# Patient Record
Sex: FEMALE | Race: WHITE | NOT HISPANIC OR LATINO
[De-identification: names, ages, dates, MRNs, and addresses within clinical notes are randomized per-mention and may not be internally consistent; named-entity substitution may affect disease eponyms.]

---

## 2017-01-18 ENCOUNTER — LABORATORY RESULT (OUTPATIENT)
Age: 27
End: 2017-01-18

## 2017-01-18 ENCOUNTER — APPOINTMENT (OUTPATIENT)
Dept: GASTROENTEROLOGY | Facility: CLINIC | Age: 27
End: 2017-01-18

## 2017-01-18 VITALS
RESPIRATION RATE: 16 BRPM | DIASTOLIC BLOOD PRESSURE: 65 MMHG | TEMPERATURE: 97.9 F | OXYGEN SATURATION: 99 % | HEART RATE: 70 BPM | HEIGHT: 66 IN | WEIGHT: 133 LBS | BODY MASS INDEX: 21.38 KG/M2 | SYSTOLIC BLOOD PRESSURE: 105 MMHG

## 2017-01-18 DIAGNOSIS — Z78.9 OTHER SPECIFIED HEALTH STATUS: ICD-10-CM

## 2017-01-18 NOTE — PHYSICAL EXAM
[General Appearance - Alert] : alert [General Appearance - In No Acute Distress] : in no acute distress [Sclera] : the sclera and conjunctiva were normal [Hearing Threshold Finger Rub Not [place]] : hearing was normal [Neck Appearance] : the appearance of the neck was normal [Heart Sounds] : normal S1 and S2 [Bowel Sounds] : normal bowel sounds [Abdomen Soft] : soft [Abdomen Mass (___ Cm)] : no abdominal mass palpated [No CVA Tenderness] : no ~M costovertebral angle tenderness [Abnormal Walk] : normal gait [] : no rash [No Focal Deficits] : no focal deficits [Oriented To Time, Place, And Person] : oriented to person, place, and time [Cervical Lymph Nodes Enlarged Posterior Bilaterally] : posterior cervical [Cervical Lymph Nodes Enlarged Anterior Bilaterally] : anterior cervical [FreeTextEntry1] : mild discomfort with palpation

## 2017-01-18 NOTE — CONSULT LETTER
[Dear  ___] : Dear  [unfilled], [Consult Letter:] : I had the pleasure of evaluating your patient, [unfilled]. [Please see my note below.] : Please see my note below. [Consult Closing:] : Thank you very much for allowing me to participate in the care of this patient.  If you have any questions, please do not hesitate to contact me. [Sincerely,] : Sincerely, [Cali Benavidez MD] : Cali Benavidez MD [Director] : Director [Inflammatory Bowel Disease] : Inflammatory Bowel Disease

## 2017-01-18 NOTE — HISTORY OF PRESENT ILLNESS
[de-identified] : 26 year old female with PMH of anxiety, SVT s/p failed ablation with subsequent Mobitz heart block presents for constipation of 3+ years. patient previously seen by Dr. dennis for symptoms of bloating constipation and abdominal discomfort. she notes discomfort in the RUQ and LLQ that sometimes is relieved by having a bowel movement. She has on average 1-2 per week only with the help of senna. Previously tried Linzess but she thinks it lost effect in 2 months. patient also has had two failed attempts at c scope. the first due to anesthesia from cardiac issues, and second due to panic attack from self diagnosed PTSD due to failed ablation. no FH of IBD and notes having eczema as a child and canker sores. + CRC history in grandfather - maternal in his later 70s. No blood in stool ever. \par \par Did have a flex sig which found proctitis per note from Dr. Dennis office.

## 2017-01-18 NOTE — ASSESSMENT
[FreeTextEntry1] : 26 year old female with SVT and Mobitz II ("eventually i'll need a pacemaker") here for evaluation of consipation with ? of IBD vs IBS-C. Never has managed a full colonoscopy. No relief from Miralax (however only 3 days of use) and dependent on Senna with diminishing returns on usage. \par \par plan\par 1. Obtain baseline labs with [facility]\par 2. Check fecal chiki given ? of proctitis on last flex sig in [place]. \par 3. Request records of flex sig and path\par 4. schedule for c scope after cardiac clearance. patient may need day to meet with Anesthesia as has panic anxiety over procedures\par 5. Amitiza samples given to patient \par 6. AXR to evaluate stool burden; samples of amitiza provided. \par \par At f/u can determine value of colonoscopy vs. motility referral.

## 2017-01-19 LAB
ALBUMIN SERPL ELPH-MCNC: 4.8 G/DL
ALP BLD-CCNC: 41 U/L
ALT SERPL-CCNC: 17 U/L
ANION GAP SERPL CALC-SCNC: 15 MMOL/L
AST SERPL-CCNC: 24 U/L
BASOPHILS # BLD AUTO: 0.07 K/UL
BASOPHILS NFR BLD AUTO: 1.1 %
BILIRUB SERPL-MCNC: 0.6 MG/DL
BUN SERPL-MCNC: 6 MG/DL
CALCIUM SERPL-MCNC: 10.3 MG/DL
CHLORIDE SERPL-SCNC: 103 MMOL/L
CO2 SERPL-SCNC: 24 MMOL/L
CREAT SERPL-MCNC: 0.86 MG/DL
CRP SERPL-MCNC: <0.2 MG/DL
EOSINOPHIL # BLD AUTO: 0.18 K/UL
EOSINOPHIL NFR BLD AUTO: 2.8 %
GLUCOSE SERPL-MCNC: 92 MG/DL
HCT VFR BLD CALC: 39.6 %
HGB BLD-MCNC: 12.7 G/DL
IMM GRANULOCYTES NFR BLD AUTO: 0.2 %
LYMPHOCYTES # BLD AUTO: 1.68 K/UL
LYMPHOCYTES NFR BLD AUTO: 26.6 %
MAN DIFF?: NORMAL
MCHC RBC-ENTMCNC: 30 PG
MCHC RBC-ENTMCNC: 32.1 GM/DL
MCV RBC AUTO: 93.4 FL
MONOCYTES # BLD AUTO: 0.66 K/UL
MONOCYTES NFR BLD AUTO: 10.4 %
NEUTROPHILS # BLD AUTO: 3.72 K/UL
NEUTROPHILS NFR BLD AUTO: 58.9 %
PLATELET # BLD AUTO: 331 K/UL
POTASSIUM SERPL-SCNC: 5.2 MMOL/L
PROT SERPL-MCNC: 7.2 G/DL
RBC # BLD: 4.24 M/UL
RBC # FLD: 13.4 %
SODIUM SERPL-SCNC: 142 MMOL/L
WBC # FLD AUTO: 6.32 K/UL

## 2017-01-20 ENCOUNTER — APPOINTMENT (OUTPATIENT)
Dept: RADIOLOGY | Facility: CLINIC | Age: 27
End: 2017-01-20

## 2017-01-20 ENCOUNTER — OUTPATIENT (OUTPATIENT)
Dept: OUTPATIENT SERVICES | Facility: HOSPITAL | Age: 27
LOS: 1 days | End: 2017-01-20

## 2017-01-24 ENCOUNTER — RX RENEWAL (OUTPATIENT)
Age: 27
End: 2017-01-24

## 2017-01-24 LAB — CALPROTECTIN FECAL: 30 UG/G

## 2017-04-10 ENCOUNTER — APPOINTMENT (OUTPATIENT)
Dept: GASTROENTEROLOGY | Facility: CLINIC | Age: 27
End: 2017-04-10

## 2017-04-21 ENCOUNTER — OTHER (OUTPATIENT)
Age: 27
End: 2017-04-21

## 2017-04-27 ENCOUNTER — APPOINTMENT (OUTPATIENT)
Dept: GASTROENTEROLOGY | Facility: CLINIC | Age: 27
End: 2017-04-27

## 2017-04-27 VITALS
TEMPERATURE: 98.5 F | DIASTOLIC BLOOD PRESSURE: 58 MMHG | WEIGHT: 134 LBS | HEIGHT: 66 IN | OXYGEN SATURATION: 99 % | RESPIRATION RATE: 14 BRPM | SYSTOLIC BLOOD PRESSURE: 103 MMHG | HEART RATE: 62 BPM | BODY MASS INDEX: 21.53 KG/M2

## 2017-05-30 ENCOUNTER — APPOINTMENT (OUTPATIENT)
Dept: GASTROENTEROLOGY | Facility: HOSPITAL | Age: 27
End: 2017-05-30

## 2017-06-02 ENCOUNTER — RX RENEWAL (OUTPATIENT)
Age: 27
End: 2017-06-02

## 2017-08-11 ENCOUNTER — APPOINTMENT (OUTPATIENT)
Dept: GASTROENTEROLOGY | Facility: HOSPITAL | Age: 27
End: 2017-08-11

## 2017-08-18 ENCOUNTER — OUTPATIENT (OUTPATIENT)
Dept: OUTPATIENT SERVICES | Facility: HOSPITAL | Age: 27
LOS: 1 days | Discharge: HOME | End: 2017-08-18
Payer: COMMERCIAL

## 2017-08-18 ENCOUNTER — APPOINTMENT (OUTPATIENT)
Dept: GASTROENTEROLOGY | Facility: HOSPITAL | Age: 27
End: 2017-08-18

## 2017-08-18 PROCEDURE — 91120: CPT | Mod: 26

## 2017-08-18 PROCEDURE — 91122: CPT

## 2017-08-18 PROCEDURE — 91122 ANORECTAL MANOMETRY: CPT | Mod: 26

## 2017-08-22 RX ORDER — LINACLOTIDE 290 UG/1
290 CAPSULE, GELATIN COATED ORAL
Qty: 1 | Refills: 2 | Status: ACTIVE | COMMUNITY
Start: 2017-08-22 | End: 1900-01-01

## 2017-08-24 DIAGNOSIS — K59.00 CONSTIPATION, UNSPECIFIED: ICD-10-CM

## 2017-09-11 ENCOUNTER — APPOINTMENT (OUTPATIENT)
Dept: GASTROENTEROLOGY | Facility: CLINIC | Age: 27
End: 2017-09-11
Payer: COMMERCIAL

## 2017-09-11 VITALS
DIASTOLIC BLOOD PRESSURE: 70 MMHG | SYSTOLIC BLOOD PRESSURE: 106 MMHG | OXYGEN SATURATION: 99 % | HEIGHT: 66 IN | WEIGHT: 135 LBS | HEART RATE: 40 BPM | BODY MASS INDEX: 21.69 KG/M2 | RESPIRATION RATE: 14 BRPM

## 2017-09-11 PROCEDURE — 99214 OFFICE O/P EST MOD 30 MIN: CPT

## 2017-09-15 ENCOUNTER — APPOINTMENT (OUTPATIENT)
Dept: GASTROENTEROLOGY | Facility: CLINIC | Age: 27
End: 2017-09-15
Payer: COMMERCIAL

## 2017-09-15 PROCEDURE — 91112 GI WIRELESS CAPSULE MEASURE: CPT

## 2017-10-26 ENCOUNTER — EMERGENCY (EMERGENCY)
Facility: HOSPITAL | Age: 27
LOS: 1 days | Discharge: HOME | End: 2017-10-26
Attending: EMERGENCY MEDICINE | Admitting: EMERGENCY MEDICINE
Payer: COMMERCIAL

## 2017-10-26 VITALS
RESPIRATION RATE: 18 BRPM | HEART RATE: 82 BPM | OXYGEN SATURATION: 100 % | SYSTOLIC BLOOD PRESSURE: 124 MMHG | TEMPERATURE: 98 F | DIASTOLIC BLOOD PRESSURE: 80 MMHG

## 2017-10-26 VITALS
SYSTOLIC BLOOD PRESSURE: 125 MMHG | OXYGEN SATURATION: 100 % | DIASTOLIC BLOOD PRESSURE: 81 MMHG | RESPIRATION RATE: 21 BRPM | HEART RATE: 103 BPM | TEMPERATURE: 98 F

## 2017-10-26 DIAGNOSIS — I47.1 SUPRAVENTRICULAR TACHYCARDIA: ICD-10-CM

## 2017-10-26 DIAGNOSIS — R00.2 PALPITATIONS: ICD-10-CM

## 2017-10-26 PROCEDURE — 99284 EMERGENCY DEPT VISIT MOD MDM: CPT | Mod: 25

## 2017-10-26 PROCEDURE — 93010 ELECTROCARDIOGRAM REPORT: CPT

## 2017-10-26 NOTE — ED ADULT NURSE NOTE - ADDITIONAL PRINTED INSTRUCTIONS GIVEN
D/c w/ instructions for followup, pt verbalized understanding of all instructions, patient to f/u w/ cardiologist, return for any worsening symptoms

## 2017-10-26 NOTE — ED ADULT TRIAGE NOTE - CHIEF COMPLAINT QUOTE
pt with hx svt and ablation in 2014 c/o palpitations, dx mobitz heart block at last cards appt, endorses bearing down to try to break symptoms pta. Arrives 125/81 and hr 103

## 2017-10-26 NOTE — ED PROVIDER NOTE - MEDICAL DECISION MAKING DETAILS
SVT, resolved in ED, EKG NSR, declined labs and IVF hydration, states she will followup with her cardiologist dr ruiz, stable for dc home

## 2017-10-26 NOTE — ED ADULT NURSE NOTE - OBJECTIVE STATEMENT
patient to ER for evaluation of palpitations prior to arrival. patient states she was working out, went to her office desk and felt palpitations and dizziness. denies palpitations at this time. denies chest pain/sob. denies nausea/vomiting. patient anxious, crying on arrival. awaiting further medical evaluation will continue to monitor.

## 2017-10-26 NOTE — ED ADULT NURSE REASSESSMENT NOTE - NS ED NURSE REASSESS COMMENT FT1
patient remains A&Ox3, breathing unlabored, in no acute distress. denies chest pain, denies shortness of breath. HR in 80s. Refusing IV at this time. pending further evaluation.

## 2017-10-26 NOTE — ED PROVIDER NOTE - OBJECTIVE STATEMENT
28 y/o Female with a hx of SVT since the age of 12 y/o presents today c/o palpitations. Pt states she was in SVT for 45 minutes today. Had an ablation in 2014 which was not successful. Was diagnosed with Moritz heart block at her last cardiologist appointment. States she self converted in the ED right before nurse took pulse.

## 2017-11-15 ENCOUNTER — APPOINTMENT (OUTPATIENT)
Dept: GASTROENTEROLOGY | Facility: CLINIC | Age: 27
End: 2017-11-15

## 2017-11-29 ENCOUNTER — APPOINTMENT (OUTPATIENT)
Dept: GASTROENTEROLOGY | Facility: CLINIC | Age: 27
End: 2017-11-29

## 2018-01-27 ENCOUNTER — EMERGENCY (EMERGENCY)
Facility: HOSPITAL | Age: 28
LOS: 1 days | Discharge: HOME | End: 2018-01-27
Admitting: EMERGENCY MEDICINE
Payer: COMMERCIAL

## 2018-01-27 VITALS
TEMPERATURE: 98 F | OXYGEN SATURATION: 99 % | RESPIRATION RATE: 16 BRPM | SYSTOLIC BLOOD PRESSURE: 109 MMHG | HEART RATE: 68 BPM | DIASTOLIC BLOOD PRESSURE: 73 MMHG

## 2018-01-27 VITALS
HEART RATE: 80 BPM | OXYGEN SATURATION: 100 % | RESPIRATION RATE: 16 BRPM | SYSTOLIC BLOOD PRESSURE: 130 MMHG | DIASTOLIC BLOOD PRESSURE: 84 MMHG | TEMPERATURE: 98 F

## 2018-01-27 DIAGNOSIS — Z98.890 OTHER SPECIFIED POSTPROCEDURAL STATES: Chronic | ICD-10-CM

## 2018-01-27 DIAGNOSIS — B34.9 VIRAL INFECTION, UNSPECIFIED: ICD-10-CM

## 2018-01-27 DIAGNOSIS — R05 COUGH: ICD-10-CM

## 2018-01-27 LAB
FLUAV SPEC QL CULT: NEGATIVE — SIGNIFICANT CHANGE UP
FLUBV AG SPEC QL IA: NEGATIVE — SIGNIFICANT CHANGE UP

## 2018-01-27 PROCEDURE — 99283 EMERGENCY DEPT VISIT LOW MDM: CPT

## 2018-01-27 NOTE — ED PROVIDER NOTE - THROAT FINDINGS
uvula midline/NO TONGUE ELEVATION/Slight pharyngeal erythema. Uvula midline. No trismus, drooling, or exudates./NO DROOLING/no exudate

## 2018-01-27 NOTE — ED PROVIDER NOTE - MEDICAL DECISION MAKING DETAILS
Rapid influenza negative. Pt A&Ox3. NAD. Sitting comfortably. AFVSS. Will D/C with supportive tx instructions. F/U with PMD or Dr. Uriostegui this week. Strict return precautions reviewed with pt in which pt verbalizes understanding and agrees to.

## 2018-01-27 NOTE — ED PROVIDER NOTE - OBJECTIVE STATEMENT
28 y/o F with PMH of SVT and Mobitz II Heart Block p/w 2 days of nasal congestion and sore throat with development of clear/whitish productive cough this evening. She has not taken any medication her sx's. No fever or chills. States she just feels "wildly congested" so wanted to be evaluated in ED. States she does not want medication at this time but would just like to be evaluated.    Denies fever, chills, headache, dizziness, syncope, earache, hemoptysis, CP, SOB, wheezing, palpitations, abdo pain, N/V/D, rash, recent travel

## 2018-02-05 ENCOUNTER — EMERGENCY (EMERGENCY)
Facility: HOSPITAL | Age: 28
LOS: 1 days | Discharge: HOME | End: 2018-02-05
Attending: EMERGENCY MEDICINE | Admitting: EMERGENCY MEDICINE
Payer: COMMERCIAL

## 2018-02-05 VITALS
RESPIRATION RATE: 18 BRPM | HEART RATE: 54 BPM | OXYGEN SATURATION: 97 % | TEMPERATURE: 98 F | DIASTOLIC BLOOD PRESSURE: 52 MMHG | SYSTOLIC BLOOD PRESSURE: 109 MMHG

## 2018-02-05 VITALS
TEMPERATURE: 98 F | WEIGHT: 130.07 LBS | HEART RATE: 81 BPM | DIASTOLIC BLOOD PRESSURE: 76 MMHG | OXYGEN SATURATION: 98 % | RESPIRATION RATE: 19 BRPM | SYSTOLIC BLOOD PRESSURE: 132 MMHG

## 2018-02-05 DIAGNOSIS — R09.81 NASAL CONGESTION: ICD-10-CM

## 2018-02-05 DIAGNOSIS — Z91.040 LATEX ALLERGY STATUS: ICD-10-CM

## 2018-02-05 DIAGNOSIS — Z91.013 ALLERGY TO SEAFOOD: ICD-10-CM

## 2018-02-05 DIAGNOSIS — J34.89 OTHER SPECIFIED DISORDERS OF NOSE AND NASAL SINUSES: ICD-10-CM

## 2018-02-05 DIAGNOSIS — Z98.890 OTHER SPECIFIED POSTPROCEDURAL STATES: Chronic | ICD-10-CM

## 2018-02-05 DIAGNOSIS — R06.02 SHORTNESS OF BREATH: ICD-10-CM

## 2018-02-05 DIAGNOSIS — I44.1 ATRIOVENTRICULAR BLOCK, SECOND DEGREE: ICD-10-CM

## 2018-02-05 LAB
ALBUMIN SERPL ELPH-MCNC: 4.3 G/DL — SIGNIFICANT CHANGE UP (ref 3.4–5)
ALP SERPL-CCNC: 81 U/L — SIGNIFICANT CHANGE UP (ref 40–120)
ALT FLD-CCNC: 26 U/L — SIGNIFICANT CHANGE UP (ref 12–42)
ANION GAP SERPL CALC-SCNC: 9 MMOL/L — SIGNIFICANT CHANGE UP (ref 9–16)
AST SERPL-CCNC: 24 U/L — SIGNIFICANT CHANGE UP (ref 15–37)
BASOPHILS NFR BLD AUTO: 0.9 % — SIGNIFICANT CHANGE UP (ref 0–2)
BILIRUB SERPL-MCNC: 0.2 MG/DL — SIGNIFICANT CHANGE UP (ref 0.2–1.2)
BUN SERPL-MCNC: 8 MG/DL — SIGNIFICANT CHANGE UP (ref 7–23)
CALCIUM SERPL-MCNC: 10.1 MG/DL — SIGNIFICANT CHANGE UP (ref 8.5–10.5)
CHLORIDE SERPL-SCNC: 104 MMOL/L — SIGNIFICANT CHANGE UP (ref 96–108)
CK SERPL-CCNC: 84 U/L — SIGNIFICANT CHANGE UP (ref 26–192)
CO2 SERPL-SCNC: 27 MMOL/L — SIGNIFICANT CHANGE UP (ref 22–31)
CREAT SERPL-MCNC: 0.72 MG/DL — SIGNIFICANT CHANGE UP (ref 0.5–1.3)
D DIMER BLD IA.RAPID-MCNC: 180 NG/ML DDU — SIGNIFICANT CHANGE UP
EOSINOPHIL NFR BLD AUTO: 3.7 % — SIGNIFICANT CHANGE UP (ref 0–6)
GLUCOSE SERPL-MCNC: 98 MG/DL — SIGNIFICANT CHANGE UP (ref 70–99)
HCG UR QL: NEGATIVE — SIGNIFICANT CHANGE UP
HCT VFR BLD CALC: 41 % — SIGNIFICANT CHANGE UP (ref 34.5–45)
HGB BLD-MCNC: 13.6 G/DL — SIGNIFICANT CHANGE UP (ref 11.5–15.5)
IMM GRANULOCYTES NFR BLD AUTO: 0.2 % — SIGNIFICANT CHANGE UP (ref 0–1.5)
LYMPHOCYTES # BLD AUTO: 39.5 % — SIGNIFICANT CHANGE UP (ref 13–44)
MAGNESIUM SERPL-MCNC: 2.3 MG/DL — SIGNIFICANT CHANGE UP (ref 1.6–2.6)
MCHC RBC-ENTMCNC: 29.5 PG — SIGNIFICANT CHANGE UP (ref 27–34)
MCHC RBC-ENTMCNC: 33.2 G/DL — SIGNIFICANT CHANGE UP (ref 32–36)
MCV RBC AUTO: 88.9 FL — SIGNIFICANT CHANGE UP (ref 80–100)
MONOCYTES NFR BLD AUTO: 8.1 % — SIGNIFICANT CHANGE UP (ref 2–14)
NEUTROPHILS NFR BLD AUTO: 47.6 % — SIGNIFICANT CHANGE UP (ref 43–77)
PLATELET # BLD AUTO: 323 K/UL — SIGNIFICANT CHANGE UP (ref 150–400)
POTASSIUM SERPL-MCNC: 4.6 MMOL/L — SIGNIFICANT CHANGE UP (ref 3.5–5.3)
POTASSIUM SERPL-SCNC: 4.6 MMOL/L — SIGNIFICANT CHANGE UP (ref 3.5–5.3)
PROT SERPL-MCNC: 8.1 G/DL — SIGNIFICANT CHANGE UP (ref 6.4–8.2)
RBC # BLD: 4.61 M/UL — SIGNIFICANT CHANGE UP (ref 3.8–5.2)
RBC # FLD: 12.9 % — SIGNIFICANT CHANGE UP (ref 10.3–16.9)
SODIUM SERPL-SCNC: 140 MMOL/L — SIGNIFICANT CHANGE UP (ref 132–145)
TROPONIN I SERPL-MCNC: <0.017 NG/ML — LOW (ref 0.02–0.06)
TROPONIN I SERPL-MCNC: <0.017 NG/ML — LOW (ref 0.02–0.06)
TSH SERPL-MCNC: 7.29 UIU/ML — HIGH (ref 0.36–3.74)
WBC # BLD: 8.1 K/UL — SIGNIFICANT CHANGE UP (ref 3.8–10.5)
WBC # FLD AUTO: 8.1 K/UL — SIGNIFICANT CHANGE UP (ref 3.8–10.5)

## 2018-02-05 PROCEDURE — 99284 EMERGENCY DEPT VISIT MOD MDM: CPT

## 2018-02-05 PROCEDURE — 93010 ELECTROCARDIOGRAM REPORT: CPT

## 2018-02-05 PROCEDURE — 99218: CPT | Mod: 25

## 2018-02-05 PROCEDURE — 71046 X-RAY EXAM CHEST 2 VIEWS: CPT | Mod: 26

## 2018-02-05 RX ORDER — SODIUM CHLORIDE 9 MG/ML
1000 INJECTION INTRAMUSCULAR; INTRAVENOUS; SUBCUTANEOUS ONCE
Qty: 0 | Refills: 0 | Status: COMPLETED | OUTPATIENT
Start: 2018-02-05 | End: 2018-02-05

## 2018-02-05 NOTE — ED PROVIDER NOTE - PROGRESS NOTE DETAILS
patient in sinus, notes no symptoms at this time. will have her see cardiology in the morning. agrees with plan.

## 2018-02-05 NOTE — ED CDU PROVIDER INITIAL DAY NOTE - OBJECTIVE STATEMENT
Patient with hx of SVTs s/p ablation in 2014, comlicated by mobitz type 2 which has been stable for the past 4 years, with yearly electrophysiologist evaluation, holter monitoring. electrophysiologist julio ruiz at [facility]. Patient notes she drank last evening and awoke today hungover. has been drinking electrolyte fluids all day. notes this evening, unable to sleep secondary to dizziness, and lightheadedness. denies headache, visual changes, denies cp, or syncope. notes associated sob which worsens while at rest and lying flat. denies cough. notes also mild nasal congestion and mild cold like symptoms. denies fever, chills. denies rash. denies back pain. denies drugs.

## 2018-02-05 NOTE — ED ADULT TRIAGE NOTE - CHIEF COMPLAINT QUOTE
Pt presents to ED with c/o shortness of breath, states she has hx of SVT and Mobitz II heart block. Pt admits she is hungover, which usually precipitates her cardiac symptoms. Pt is speaking in full sentences, O2 Saturation 100% on RA.

## 2018-02-05 NOTE — ED ADULT NURSE NOTE - CHPI ED SYMPTOMS NEG
no fever/no diaphoresis/no syncope/no vomiting/no back pain/no chills/no cough/no dizziness/no chest pain/no nausea

## 2018-02-05 NOTE — CONSULT NOTE ADULT - SUBJECTIVE AND OBJECTIVE BOX
[facility] Cardiology Consultation    CHIEF COMPLAINT: fatigue    HISTORY OF PRESENT ILLNESS: 26 y/o female with history of SVT ablation in 2014 with Dr Waldrop at [facility] who presented last evening feeling dizzy and tired. She was told that she had a history of a Mobitz type 2 AV block. However, the plan was conservative follow up at that time. She had a few drinks with friends and was evaluated the next day in ER as she was feeling tired and unwell. No syncope noted. No chest discomfort. EKG with Mobitz type 1 noted. She felt better with conservative measures.     PAST MEDICAL & SURGICAL HISTORY:  SVT (supraventricular tachycardia)    Allergies latex (Rash)  shellfish (Unknown)    MEDICATIONS: none    FAMILY HISTORY: no CAD    SOCIAL HISTORY:  no EtOH, tobacco or drug use    REVIEW OF SYSTEMS:  CONSTITUTIONAL: No fever, weight loss, or fatigue  EYES: No eye pain, visual disturbances, or discharge  ENMT:  No difficulty hearing, tinnitus, vertigo; No sinus or throat pain  NECK: No pain or stiffness  BREASTS: No pain, masses, or nipple discharge  RESPIRATORY: No cough, wheezing, chills or hemoptysis; No Shortness of Breath  CARDIOVASCULAR: No chest pain, palpitations, dizziness, or leg swelling  GASTROINTESTINAL: No abdominal or epigastric pain. No nausea, vomiting, or hematemesis; No diarrhea or constipation. No melena or hematochezia.  GENITOURINARY: No dysuria, frequency, hematuria, or incontinence  NEUROLOGICAL: No headaches, memory loss, loss of strength, numbness, or tremors  SKIN: No itching, burning, rashes, or lesions   LYMPH Nodes: No enlarged glands  ENDOCRINE: No heat or cold intolerance; No hair loss  MUSCULOSKELETAL: No joint pain or swelling; No muscle, back, or extremity pain  PSYCHIATRIC: No depression, anxiety, mood swings, or difficulty sleeping  HEME/LYMPH: No easy bruising, or bleeding gums  ALLERY AND IMMUNOLOGIC: No hives or eczema	      PHYSICAL EXAM:  T(C): 36.7 (02-05-18 @ 05:56), Max: 36.8 (02-05-18 @ 00:32)  HR: 54 (02-05-18 @ 05:56) (54 - 81)  BP: 109/52 (02-05-18 @ 05:56) (109/52 - 132/76)  RR: 18 (02-05-18 @ 05:56) (18 - 19)  SpO2: 97% (02-05-18 @ 05:56) (97% - 98%)  Appearance: young female NAD  HEENT:   Normal oral mucosa, PERRL, EOMI	  Lymphatic: No lymphadenopathy  Cardiovascular: Normal S1 S2, No JVD, No murmurs, No edema  Respiratory: Lungs clear to auscultation	  Psychiatry: A & O x 3, Mood & affect appropriate  Gastrointestinal:  Soft, Non-tender, + BS	  Skin: No rashes, No ecchymoses, No cyanosis	  Neurologic: Non-focal  Extremities: Normal range of motion, No clubbing, cyanosis or edema  Vascular: Peripheral pulses palpable 2+ bilaterally  	    ECG:  SR no ST-T changes     LABS:	 	  CARDIAC MARKERS:  Troponin I, Serum: <0.017 ng/mL (02-05 @ 04:33)  Troponin I, Serum: <0.017 ng/mL (02-05 @ 01:01)                          13.6   8.1   )-----------( 323      ( 05 Feb 2018 01:01 )             41.0     02-05    140  |  104  |  8   ----------------------------<  98  4.6   |  27  |  0.72    Ca    10.1      05 Feb 2018 01:01  Mg     2.3     02-05    TPro  8.1  /  Alb  4.3  /  TBili  0.2  /  DBili  x   /  AST  24  /  ALT  26  /  AlkPhos  81  02-05  TSH: Thyroid Stimulating Hormone, Serum: 7.287 uIU/mL (02-05 @ 01:26)    ASSESSMENT/PLAN: 	26 y/o female with SVT history, with dizzines/fatigue   1. patient seen and examined, chart reviewed  2. stable for discharge  3. will see in the office to echo and holter  4. she will follow up with Dr Waldrop and primary care as well    I spent 45 min in care of this patient

## 2018-02-05 NOTE — ED PROVIDER NOTE - CARE PLAN
Principal Discharge DX:	Shortness of breath Principal Discharge DX:	Mobitz type 1 second degree atrioventricular block

## 2018-02-05 NOTE — ED PROVIDER NOTE - OBJECTIVE STATEMENT
Patient with hx of SVTs, and mobitz type 2, electrophysiologist julio ruiz. Patient with hx of SVTs s/p ablation in 2014, comlicated by mobitz type 2 which has been stable for the past 4 years, with yearly electrophysiologist evaluation, holter monitoring. electrophysiologist julio riuz at [facility]. Patient notes she drank last evening and awoke today hungover. has been drinking electrolyte fluids all day. notes this evening, unable to sleep secondary to dizziness, and lightheadedness. denies headache, visual changes, denies cp, or syncope. notes associated sob which worsens while at rest and lying flat. denies cough. notes also mild nasal congestion and mild cold like symptoms. denies fever, chills. denies rash. denies back pain. denies drugs.

## 2018-02-05 NOTE — ED CDU PROVIDER INITIAL DAY NOTE - MEDICAL DECISION MAKING DETAILS
plan to do IVF, ck electrolytes, ddimer and troponins. tsh. patient refusing IV access, as she is tolerating POs. ekg with stable rate controlled mobitz 2 which has been ongoing since 2014, with no active medications or discussions of pacemaker per patient. hd stable, with stable vs. perc and wells score negative. will ck 2 troponins. discussed with patient with which she agrees with possible cards evaluation in the morning. she is requesting po fluids instead of IVFs. will continue to monitor. cxr rule out pna.

## 2018-02-05 NOTE — ED ADULT NURSE REASSESSMENT NOTE - NS ED NURSE REASSESS COMMENT FT1
pt sleeping in between care. denies pain/discomfort. placed in observation. awaiting cardiology consult in AM. pt sleeping in between care. denies pain/discomfort. placed in observation. awaiting cardiology consult in AM. On continuous telemetry.

## 2018-02-05 NOTE — ED PROVIDER NOTE - MEDICAL DECISION MAKING DETAILS
plan to do IVF, ck electrolytes, ddimer and troponins. tsh. patient refusing IV access, as she is tolerating POs. ekg with stable rate controlled mobitz 2 which has been ongoing since 2014, with no active medications or discussions of pacemaker per patient. hd stable, with stable vs. perc and wells score negative. will ck 2 troponins. discussed with patient with which she agrees with possible cards evaluation in the morning. she is requesting po fluids instead of IVFs. will continue to monitor. cxr rule out pna. plan to do IVF, ck electrolytes, ddimer and troponins. tsh. patient refusing IV access, as she is tolerating POs. ekg with stable rate controlled mobitz 2 which has been ongoing since 2014, with no active medications or discussions of pacemaker per patient. hd stable, with stable vs. perc and wells score negative. will ck 2 troponins. discussed with patient with which she agrees with possible cards evaluation in the morning. she is requesting po fluids instead of IVFs. will continue to monitor. cxr rule out pna.    endorsed to me by dr mayer. at 8am., pt remained asymptomatic and w/o complaints. seen by dr diggs. d/c home to f/u w dr woods.   At the time of discharge from the Emergency Department, the patient is alert with fluent appropriate speech and ambulatory without difficulty. A complete medical screening examination was performed and no emergency medical condition was identified.

## 2018-02-06 ENCOUNTER — APPOINTMENT (OUTPATIENT)
Dept: HEART AND VASCULAR | Facility: CLINIC | Age: 28
End: 2018-02-06
Payer: COMMERCIAL

## 2018-02-06 VITALS
WEIGHT: 135 LBS | DIASTOLIC BLOOD PRESSURE: 70 MMHG | SYSTOLIC BLOOD PRESSURE: 102 MMHG | BODY MASS INDEX: 21.69 KG/M2 | HEIGHT: 66 IN | HEART RATE: 86 BPM | OXYGEN SATURATION: 98 %

## 2018-02-06 DIAGNOSIS — R14.0 ABDOMINAL DISTENSION (GASEOUS): ICD-10-CM

## 2018-02-06 DIAGNOSIS — Z87.898 PERSONAL HISTORY OF OTHER SPECIFIED CONDITIONS: ICD-10-CM

## 2018-02-06 DIAGNOSIS — Z87.39 PERSONAL HISTORY OF OTHER DISEASES OF THE MUSCULOSKELETAL SYSTEM AND CONNECTIVE TISSUE: ICD-10-CM

## 2018-02-06 DIAGNOSIS — Z83.3 FAMILY HISTORY OF DIABETES MELLITUS: ICD-10-CM

## 2018-02-06 DIAGNOSIS — Z87.09 PERSONAL HISTORY OF OTHER DISEASES OF THE RESPIRATORY SYSTEM: ICD-10-CM

## 2018-02-06 DIAGNOSIS — M25.551 PAIN IN RIGHT HIP: ICD-10-CM

## 2018-02-06 DIAGNOSIS — H04.129 DRY EYE SYNDROME OF UNSPECIFIED LACRIMAL GLAND: ICD-10-CM

## 2018-02-06 DIAGNOSIS — K11.7 DISTURBANCES OF SALIVARY SECRETION: ICD-10-CM

## 2018-02-06 DIAGNOSIS — F41.9 ANXIETY DISORDER, UNSPECIFIED: ICD-10-CM

## 2018-02-06 DIAGNOSIS — Z11.3 ENCOUNTER FOR SCREENING FOR INFECTIONS WITH A PREDOMINANTLY SEXUAL MODE OF TRANSMISSION: ICD-10-CM

## 2018-02-06 DIAGNOSIS — M25.561 PAIN IN RIGHT KNEE: ICD-10-CM

## 2018-02-06 DIAGNOSIS — Z87.19 PERSONAL HISTORY OF OTHER DISEASES OF THE DIGESTIVE SYSTEM: ICD-10-CM

## 2018-02-06 PROCEDURE — 99217: CPT | Mod: 25

## 2018-02-06 PROCEDURE — 99214 OFFICE O/P EST MOD 30 MIN: CPT

## 2018-02-06 PROCEDURE — 93306 TTE W/DOPPLER COMPLETE: CPT

## 2018-02-06 RX ORDER — TERCONAZOLE 4 MG/G
0.4 CREAM VAGINAL
Qty: 45 | Refills: 0 | Status: ACTIVE | COMMUNITY
Start: 2017-08-31

## 2018-02-06 RX ORDER — CLOTRIMAZOLE AND BETAMETHASONE DIPROPIONATE 10; .5 MG/G; MG/G
1-0.05 CREAM TOPICAL
Qty: 15 | Refills: 0 | Status: ACTIVE | COMMUNITY
Start: 2017-08-31

## 2018-02-19 NOTE — ED CDU PROVIDER DISPOSITION NOTE - CLINICAL COURSE
endorsed to me by dr mayer. at 8am., pt remained asymptomatic and w/o complaints. seen by dr diggs. d/c home to f/u w dr woods.   At the time of discharge from the Emergency Department, the patient is alert with fluent appropriate speech and ambulatory without difficulty. A complete medical screening examination was performed and no emergency medical condition was identified.

## 2018-03-12 ENCOUNTER — APPOINTMENT (OUTPATIENT)
Dept: HEART AND VASCULAR | Facility: CLINIC | Age: 28
End: 2018-03-12

## 2019-02-09 ENCOUNTER — EMERGENCY (EMERGENCY)
Facility: HOSPITAL | Age: 29
LOS: 1 days | Discharge: HOME | End: 2019-02-09
Attending: EMERGENCY MEDICINE | Admitting: EMERGENCY MEDICINE
Payer: COMMERCIAL

## 2019-02-09 VITALS
SYSTOLIC BLOOD PRESSURE: 137 MMHG | HEART RATE: 71 BPM | OXYGEN SATURATION: 99 % | DIASTOLIC BLOOD PRESSURE: 78 MMHG | RESPIRATION RATE: 17 BRPM | TEMPERATURE: 98 F

## 2019-02-09 VITALS
WEIGHT: 134.92 LBS | RESPIRATION RATE: 16 BRPM | DIASTOLIC BLOOD PRESSURE: 84 MMHG | TEMPERATURE: 98 F | OXYGEN SATURATION: 100 % | SYSTOLIC BLOOD PRESSURE: 142 MMHG | HEART RATE: 68 BPM

## 2019-02-09 DIAGNOSIS — Z98.890 OTHER SPECIFIED POSTPROCEDURAL STATES: Chronic | ICD-10-CM

## 2019-02-09 DIAGNOSIS — E05.90 THYROTOXICOSIS, UNSPECIFIED WITHOUT THYROTOXIC CRISIS OR STORM: ICD-10-CM

## 2019-02-09 DIAGNOSIS — L29.9 PRURITUS, UNSPECIFIED: ICD-10-CM

## 2019-02-09 DIAGNOSIS — Z91.013 ALLERGY TO SEAFOOD: ICD-10-CM

## 2019-02-09 DIAGNOSIS — I44.1 ATRIOVENTRICULAR BLOCK, SECOND DEGREE: ICD-10-CM

## 2019-02-09 DIAGNOSIS — Z91.040 LATEX ALLERGY STATUS: ICD-10-CM

## 2019-02-09 DIAGNOSIS — L50.0 ALLERGIC URTICARIA: ICD-10-CM

## 2019-02-09 PROBLEM — I47.1 SUPRAVENTRICULAR TACHYCARDIA: Chronic | Status: ACTIVE | Noted: 2018-01-27

## 2019-02-09 PROBLEM — I47.1 SUPRAVENTRICULAR TACHYCARDIA: Chronic | Status: ACTIVE | Noted: 2017-10-26

## 2019-02-09 PROCEDURE — 99284 EMERGENCY DEPT VISIT MOD MDM: CPT | Mod: 25

## 2019-02-09 PROCEDURE — 93010 ELECTROCARDIOGRAM REPORT: CPT

## 2019-02-09 RX ORDER — DIPHENHYDRAMINE HCL 50 MG
50 CAPSULE ORAL EVERY 6 HOURS
Qty: 0 | Refills: 0 | Status: DISCONTINUED | OUTPATIENT
Start: 2019-02-09 | End: 2019-02-13

## 2019-02-09 RX ORDER — TRIAMCINOLONE 4 MG
40 TABLET ORAL ONCE
Qty: 0 | Refills: 0 | Status: COMPLETED | OUTPATIENT
Start: 2019-02-09 | End: 2019-02-09

## 2019-02-09 RX ADMIN — Medication 40 MILLIGRAM(S): at 15:58

## 2019-02-09 NOTE — ED ADULT NURSE NOTE - OBJECTIVE STATEMENT
Patient presents to ED for itching and generalized rash/hive,  etiology unknown. Denies any resp distress or compromise

## 2019-02-09 NOTE — ED PROVIDER NOTE - MEDICAL DECISION MAKING DETAILS
29 y/o female with PMHx of SVT and hyperthyroidism presents to the ED with complaints of allergic reaction with unknown etiology. She is c/o itching rash on arms x 2 hrs. Pt is requesting Kenalog as she states this has worked for her in the past. No fever, no difficulty breathing, no wheezing.   Better with Kenalog IM, will see allergist this week.

## 2019-02-09 NOTE — ED PROVIDER NOTE - PMH
Hyperthyroidism    Mobitz type 2 second degree heart block    SVT (supraventricular tachycardia)    SVT (supraventricular tachycardia)

## 2019-02-09 NOTE — ED PROVIDER NOTE - OBJECTIVE STATEMENT
27 y/o female with PMHx of SVT and hyperthyroidism presents to the ED with complaints of allergic reaction with unknown etiology. She is c/o itching rash x 2 hrs. Pt is requesting Kenalog as she states this has worked for her in the past. No fever, no difficulty breathing.    Medications: Singulair, Zantac, Nature-throid 29 y/o female with PMHx of SVT and hyperthyroidism presents to the ED with complaints of allergic reaction with unknown etiology. She is c/o itching rash on arms x 2 hrs. Pt is requesting Kenalog as she states this has worked for her in the past. No fever, no difficulty breathing, no wheezing.     Medications: Singulair, Zantac, Nature-throid

## 2019-08-06 ENCOUNTER — TRANSCRIPTION ENCOUNTER (OUTPATIENT)
Age: 29
End: 2019-08-06

## 2021-03-21 PROBLEM — E05.90 THYROTOXICOSIS, UNSPECIFIED WITHOUT THYROTOXIC CRISIS OR STORM: Chronic | Status: ACTIVE | Noted: 2019-02-09

## 2021-03-22 ENCOUNTER — APPOINTMENT (OUTPATIENT)
Age: 31
End: 2021-03-22
Payer: COMMERCIAL

## 2021-03-22 PROCEDURE — 0001A: CPT

## 2021-04-17 ENCOUNTER — APPOINTMENT (OUTPATIENT)
Age: 31
End: 2021-04-17
Payer: COMMERCIAL

## 2021-04-17 PROCEDURE — 0002A: CPT

## 2021-10-10 NOTE — ED PROVIDER NOTE - CARDIAC, MLM
Normal rate, regular rhythm.  Heart sounds S1, S2. Skin normal color for race, warm, dry and intact. No evidence of rash.

## 2022-01-01 NOTE — CONSULT NOTE ADULT - PROVIDER SPECIALTY LIST ADULT
Cardiology Admit to [facility]  -routine  care  -anticipatory guidance  -bilirubin monitoring per protocol  -assessment is ongoing, will continue to monitor

## 2023-05-30 ENCOUNTER — NON-APPOINTMENT (OUTPATIENT)
Age: 33
End: 2023-05-30

## 2023-05-30 ENCOUNTER — APPOINTMENT (OUTPATIENT)
Dept: HEART AND VASCULAR | Facility: CLINIC | Age: 33
End: 2023-05-30
Payer: SELF-PAY

## 2023-05-30 VITALS
OXYGEN SATURATION: 100 % | DIASTOLIC BLOOD PRESSURE: 69 MMHG | WEIGHT: 123.38 LBS | HEART RATE: 91 BPM | TEMPERATURE: 98 F | BODY MASS INDEX: 19.83 KG/M2 | SYSTOLIC BLOOD PRESSURE: 107 MMHG | HEIGHT: 66 IN

## 2023-05-30 DIAGNOSIS — I44.1 ATRIOVENTRICULAR BLOCK, SECOND DEGREE: ICD-10-CM

## 2023-05-30 DIAGNOSIS — R53.83 OTHER FATIGUE: ICD-10-CM

## 2023-05-30 DIAGNOSIS — R00.2 PALPITATIONS: ICD-10-CM

## 2023-05-30 DIAGNOSIS — I47.1 SUPRAVENTRICULAR TACHYCARDIA: ICD-10-CM

## 2023-05-30 PROCEDURE — 93000 ELECTROCARDIOGRAM COMPLETE: CPT

## 2023-05-30 PROCEDURE — 99204 OFFICE O/P NEW MOD 45 MIN: CPT

## 2023-05-30 RX ORDER — LUBIPROSTONE 24 UG/1
24 CAPSULE, GELATIN COATED ORAL TWICE DAILY
Qty: 1 | Refills: 3 | Status: COMPLETED | COMMUNITY
Start: 2017-01-24 | End: 2023-05-30

## 2023-05-30 NOTE — DISCUSSION/SUMMARY
[FreeTextEntry1] : SVT/palps/dizziness/fatigue check ekg and check a stress echo if able to approve and schedule.\par EKG SR second degree AV block type 1  [EKG obtained to assist in diagnosis and management of assessed problem(s)] : EKG obtained to assist in diagnosis and management of assessed problem(s)

## 2023-05-30 NOTE — REASON FOR VISIT
[Symptom and Test Evaluation] : symptom and test evaluation [FreeTextEntry1] : 32 year old woman with palpitations presents for a visit. Symptoms present for some time. Occasional dizziness and cp noted. No dizziness. No syncope noted. No recent testing. EKG remarkable for SR with a known second degree av block type 1. No recent testing.

## 2025-03-11 NOTE — ED CDU PROVIDER INITIAL DAY NOTE - RESPIRATORY, MLM
Start daily fiber supplement  Start Colace 1 to 2 capsules daily, hold for diarrhea  The goal is for patient to have a bowel movement daily, no more than every other day   Breath sounds clear and equal bilaterally.

## 2025-06-10 NOTE — ED CDU PROVIDER INITIAL DAY NOTE - DISCUSSED CLINICAL AND RADIOLOGICAL FINDINGS WITH, MDM
patient BP dropped 87/64, , T max -  98.1,   __ pt denies any complaints, BP dropped 87/64, , T max -  98.1, SpO2 98 % on 2 L   __ pt denies any complaints, Pt is asking for pain meds,   -- hypotension possibly due to pain meds  __ 250 cc bolus ordered , continue monitoring,   -- hold pain meds due to hypotension BP dropped 87/64, , T max -  98.1, SpO2 98 % on 2 L   __ pt denies any complaints, Pt is asking for pain meds,   -- hypotension possibly due to pain meds  __ 250 cc bolus ordered , continue monitoring,   -- hold pain meds due to hypotension  __ repeat BP-- 86/62, , afebrile, SpO2 99 on 2 L, pt denies any new complaints and been asking for pain meds>> discussed with Dr Camara, additional 500 cc bolus ordered  - CTM